# Patient Record
Sex: MALE | Race: WHITE | Employment: FULL TIME | URBAN - METROPOLITAN AREA
[De-identification: names, ages, dates, MRNs, and addresses within clinical notes are randomized per-mention and may not be internally consistent; named-entity substitution may affect disease eponyms.]

---

## 2021-01-26 ENCOUNTER — OFFICE VISIT (OUTPATIENT)
Dept: FAMILY MEDICINE | Facility: CLINIC | Age: 46
End: 2021-01-26
Payer: COMMERCIAL

## 2021-01-26 VITALS
HEIGHT: 68 IN | RESPIRATION RATE: 16 BRPM | DIASTOLIC BLOOD PRESSURE: 86 MMHG | WEIGHT: 166 LBS | HEART RATE: 53 BPM | TEMPERATURE: 97 F | BODY MASS INDEX: 25.16 KG/M2 | OXYGEN SATURATION: 97 % | SYSTOLIC BLOOD PRESSURE: 135 MMHG

## 2021-01-26 DIAGNOSIS — Z13.21 ENCOUNTER FOR VITAMIN DEFICIENCY SCREENING: ICD-10-CM

## 2021-01-26 DIAGNOSIS — Z13.1 SCREENING FOR DIABETES MELLITUS: ICD-10-CM

## 2021-01-26 DIAGNOSIS — Z13.220 SCREENING FOR HYPERLIPIDEMIA: ICD-10-CM

## 2021-01-26 DIAGNOSIS — H92.02 LEFT EAR PAIN: Primary | ICD-10-CM

## 2021-01-26 DIAGNOSIS — R13.10 DYSPHAGIA, UNSPECIFIED TYPE: ICD-10-CM

## 2021-01-26 DIAGNOSIS — Z86.0100 PERSONAL HISTORY OF COLONIC POLYPS: ICD-10-CM

## 2021-01-26 DIAGNOSIS — H61.22 IMPACTED CERUMEN OF LEFT EAR: ICD-10-CM

## 2021-01-26 PROCEDURE — 99204 OFFICE O/P NEW MOD 45 MIN: CPT | Mod: 25 | Performed by: NURSE PRACTITIONER

## 2021-01-26 PROCEDURE — 69210 REMOVE IMPACTED EAR WAX UNI: CPT | Mod: LT | Performed by: NURSE PRACTITIONER

## 2021-01-26 ASSESSMENT — MIFFLIN-ST. JEOR: SCORE: 1612.47

## 2021-01-26 NOTE — PROGRESS NOTES
"  Assessment & Plan     (H92.02) Left ear pain  (primary encounter diagnosis)  Comment: Etiology uncertain  Plan: OTOLARYNGOLOGY REFERRAL        I had a discussion with the patient that his ear issues may have been related to the earwax, but other differential diagnosis is including but not limited to TMJ, viral, eustachian tube dysfunction etc.  For now, he will see if clearing the cerumen helps otherwise he will follow-up with ENT for a hearing eval and ear consultation if his symptoms do not clear.    (R13.10) Dysphagia, unspecified type  Comment: Etiology uncertain  Plan: GASTROENTEROLOGY ADULT REF CONSULT ONLY        The patient has a history of the sensation of food getting caught in his esophagus as well as questions about colon polyps.  I did talk to him about chewing his food thoroughly, adding fluids with his meals, and monitor and use caution with eating.  He is to follow-up with GI for a consultation and possible upper GI endoscopy and discussion about additional colonoscopy.  He is appreciative.    (H61.22) Impacted cerumen of left ear  Comment: Improved  Plan: REMOVE IMPACTED CERUMEN        See above    (Z86.010) Personal history of colonic polyps  Comment: History of  Plan: Refer to GI.  See above.    (Z13.1) Screening for diabetes mellitus  Comment:   Plan: Comprehensive metabolic panel        Done today    (Z13.220) Screening for hyperlipidemia  Comment:   Plan: Lipid panel reflex to direct LDL Fasting        Done today    (Z13.21) Encounter for vitamin deficiency screening  Comment:   Plan: Vitamin D Deficiency        Done today       BMI:   Estimated body mass index is 25.24 kg/m  as calculated from the following:    Height as of this encounter: 1.727 m (5' 8\").    Weight as of this encounter: 75.3 kg (166 lb).         See Patient Instructions    Return for Physical Exam.    Nydia Bro, MALLORY CNP  [facility]    Jazmin Salazar is a 45 year old who " "presents to clinic today for the following health issues:    HPI       Chief Complaint   Patient presents with     Establish Care     Ear Problem     both ears but mostly the left side since june comes and goes, jaw popping       ESTABLISHING CARE.  Generallyhealthy.    Left ear pain.  \"it feels like fluid in there.\"  Hearing sensitive.  \"I feel cloudy in the head.\"  Symptoms come and go with \"how sensitive my hearing is.\"  Feeling irritable with loud  Sounds/when his kids are yelling.  Today he is wondering what else can be done about his ear.  He did see his Dentist who thought he Possibly had left TMJ.  He did see a TMJ specialist \"but I am not convinced it is TMJ.\"  No jaw popping or crepitus.  No fever or acute URI symptoms.  No allergy symptoms.    Swallowing:  \"I have a hard time  Swallowing stuff that isn't wet.\"  He has never choked, but food will feel like it gets stuck in his esophogus.  Waiting it out, walking  Around will help and then the food will move down.    History of colon polyps.  Last colonoscopy 6 years ago when he was living in [place].  He was told there was a benign polyp.  He is not certain what he should do about his polyp history.     Review of Systems   Constitutional, HEENT, cardiovascular, pulmonary, GI, , musculoskeletal, neuro, skin, endocrine and psych systems are negative, except as otherwise noted.      Objective    /86 (BP Location: Left arm, Patient Position: Sitting, Cuff Size: Adult Regular)   Pulse 53   Temp 97  F (36.1  C) (Temporal)   Resp 16   Ht 1.727 m (5' 8\")   Wt 75.3 kg (166 lb)   SpO2 97%   BMI 25.24 kg/m    Body mass index is 25.24 kg/m .  Physical Exam   GENERAL: healthy, alert and no distress  EYES: Eyes grossly normal to inspection, PERRL and conjunctivae and sclerae normal  HENT: right ear canal and TMis normal. His left EAC is blocked with a cerumen impaction. After irritation the EAC is clear and the TM is normal. Nose and mouth without ulcers " or lesions  NECK: no adenopathy, no asymmetry, masses, or scars and thyroid normal to palpation  RESP: lungs clear to auscultation - no rales, rhonchi or wheezes  CV: regular rate and rhythm, normal S1 S2, no S3 or S4, no murmur, click or rub, no peripheral edema and peripheral pulses strong  SKIN: no suspicious lesions or rashes. Skin warm and dry.   MS: ambulatory with a steady gait.   NEURO: Normal strength and tone, mentation intact and speech normal  PSYCH: mentation appears normal, affect normal/bright    Diagnostics:  Ordered today.

## 2021-01-26 NOTE — NURSING NOTE
Patient identified using two patient identifiers.  Ear exam showing wax occlusion completed by provider.  Solution: warm water was placed in the left ear(s) via irrigation tool: elephant ear.    Callie Porras MA

## 2021-02-08 DIAGNOSIS — Z13.1 SCREENING FOR DIABETES MELLITUS: ICD-10-CM

## 2021-02-08 DIAGNOSIS — Z13.21 ENCOUNTER FOR VITAMIN DEFICIENCY SCREENING: ICD-10-CM

## 2021-02-08 DIAGNOSIS — Z13.220 SCREENING FOR HYPERLIPIDEMIA: ICD-10-CM

## 2021-02-08 LAB
ALBUMIN SERPL-MCNC: 4 G/DL (ref 3.4–5)
ALP SERPL-CCNC: 60 U/L (ref 40–150)
ALT SERPL W P-5'-P-CCNC: 27 U/L (ref 0–70)
ANION GAP SERPL CALCULATED.3IONS-SCNC: 1 MMOL/L (ref 3–14)
AST SERPL W P-5'-P-CCNC: 22 U/L (ref 0–45)
BILIRUB SERPL-MCNC: 0.5 MG/DL (ref 0.2–1.3)
BUN SERPL-MCNC: 26 MG/DL (ref 7–30)
CALCIUM SERPL-MCNC: 9.2 MG/DL (ref 8.5–10.1)
CHLORIDE SERPL-SCNC: 107 MMOL/L (ref 94–109)
CHOLEST SERPL-MCNC: 205 MG/DL
CO2 SERPL-SCNC: 33 MMOL/L (ref 20–32)
CREAT SERPL-MCNC: 0.91 MG/DL (ref 0.66–1.25)
DEPRECATED CALCIDIOL+CALCIFEROL SERPL-MC: 23 UG/L (ref 20–75)
GFR SERPL CREATININE-BSD FRML MDRD: >90 ML/MIN/{1.73_M2}
GLUCOSE SERPL-MCNC: 74 MG/DL (ref 70–99)
HDLC SERPL-MCNC: 64 MG/DL
LDLC SERPL CALC-MCNC: 121 MG/DL
NONHDLC SERPL-MCNC: 141 MG/DL
POTASSIUM SERPL-SCNC: 3.6 MMOL/L (ref 3.4–5.3)
PROT SERPL-MCNC: 7.1 G/DL (ref 6.8–8.8)
SODIUM SERPL-SCNC: 141 MMOL/L (ref 133–144)
TRIGL SERPL-MCNC: 101 MG/DL

## 2021-02-08 PROCEDURE — 80053 COMPREHEN METABOLIC PANEL: CPT | Performed by: NURSE PRACTITIONER

## 2021-02-08 PROCEDURE — 82306 VITAMIN D 25 HYDROXY: CPT | Performed by: NURSE PRACTITIONER

## 2021-02-08 PROCEDURE — 36415 COLL VENOUS BLD VENIPUNCTURE: CPT | Performed by: NURSE PRACTITIONER

## 2021-02-08 PROCEDURE — 80061 LIPID PANEL: CPT | Performed by: NURSE PRACTITIONER

## 2021-02-09 NOTE — RESULT ENCOUNTER NOTE
Liberty Salazar,    This notice let you know that overall your labs look good.    On your metabolic panel, your carbon dioxide and anion gap levels are slightly abnormal but these not concerning.    On your cholesterol panel, your total cholesterol and LDL cholesterol, bad fat, are slightly elevated but the rest of your cholesterol panel looks great.      Let me know if you have any questions.    Nydia TEJADA CNP

## 2021-02-20 ENCOUNTER — HEALTH MAINTENANCE LETTER (OUTPATIENT)
Age: 46
End: 2021-02-20

## 2021-09-25 ENCOUNTER — HEALTH MAINTENANCE LETTER (OUTPATIENT)
Age: 46
End: 2021-09-25

## 2022-03-12 ENCOUNTER — HEALTH MAINTENANCE LETTER (OUTPATIENT)
Age: 47
End: 2022-03-12

## 2023-01-07 ENCOUNTER — HEALTH MAINTENANCE LETTER (OUTPATIENT)
Age: 48
End: 2023-01-07

## 2023-04-22 ENCOUNTER — HEALTH MAINTENANCE LETTER (OUTPATIENT)
Age: 48
End: 2023-04-22